# Patient Record
Sex: MALE | Race: WHITE | NOT HISPANIC OR LATINO | URBAN - METROPOLITAN AREA
[De-identification: names, ages, dates, MRNs, and addresses within clinical notes are randomized per-mention and may not be internally consistent; named-entity substitution may affect disease eponyms.]

---

## 2020-09-01 ENCOUNTER — OFFICE VISIT (OUTPATIENT)
Dept: URBAN - METROPOLITAN AREA CLINIC 118 | Facility: CLINIC | Age: 50
End: 2020-09-01
Payer: OTHER GOVERNMENT

## 2020-09-01 ENCOUNTER — DASHBOARD ENCOUNTERS (OUTPATIENT)
Age: 50
End: 2020-09-01

## 2020-09-01 DIAGNOSIS — R19.5 HEME + STOOL: ICD-10-CM

## 2020-09-01 PROCEDURE — G8427 DOCREV CUR MEDS BY ELIG CLIN: HCPCS | Performed by: INTERNAL MEDICINE

## 2020-09-01 PROCEDURE — G8417 CALC BMI ABV UP PARAM F/U: HCPCS | Performed by: INTERNAL MEDICINE

## 2020-09-01 PROCEDURE — 1036F TOBACCO NON-USER: CPT | Performed by: INTERNAL MEDICINE

## 2020-09-01 PROCEDURE — 99214 OFFICE O/P EST MOD 30 MIN: CPT | Performed by: INTERNAL MEDICINE

## 2020-09-01 RX ORDER — PRAVASTATIN SODIUM 40 MG/1
TABLET ORAL
Qty: 0 | Refills: 0 | Status: ACTIVE | COMMUNITY
Start: 2017-07-19 | End: 1900-01-01

## 2020-09-01 RX ORDER — HYOSCYAMINE SULFATE 0.12 MG/1
PLACE 1 TABLET UNDER TONGUE BY TRANSLINGUAL ROUTE 3 TIMES A DAY AS NEEDED FOR 30 DAYS TABLET, ORALLY DISINTEGRATING ORAL
Qty: 30 | Refills: 1 | Status: ACTIVE | COMMUNITY
Start: 2017-12-07 | End: 1900-01-01

## 2020-09-01 NOTE — HPI-TODAY'S VISIT:
50 yo WM here for evaluation of Heme+ stool.  Pt has loose stool with BMs 2-3x/d, but with bouts of constipation due to migraine meds, shilpa nortriptyline.  Has noted traces of BRB on TP ~ 1-3x/month.  No other change in BMs.  No abd pain, vomit.  Has gained 18# since 11/2017. No hx of PUD.  No hx of anemia.  No hx of GERD.